# Patient Record
Sex: FEMALE | Race: BLACK OR AFRICAN AMERICAN | URBAN - METROPOLITAN AREA
[De-identification: names, ages, dates, MRNs, and addresses within clinical notes are randomized per-mention and may not be internally consistent; named-entity substitution may affect disease eponyms.]

---

## 2023-08-03 LAB — HEPATITIS B VIRUS SURFACE AB (MIU/ML) IN SERUM: >1000 MIU/ML

## 2024-08-27 ENCOUNTER — LAB (OUTPATIENT)
Dept: LAB | Facility: LAB | Age: 24
End: 2024-08-27

## 2024-08-28 LAB — COTININE UR QL SCN: NEGATIVE

## 2024-09-21 ENCOUNTER — OFFICE VISIT (OUTPATIENT)
Dept: URGENT CARE | Age: 24
End: 2024-09-21
Payer: COMMERCIAL

## 2024-09-21 VITALS
SYSTOLIC BLOOD PRESSURE: 124 MMHG | RESPIRATION RATE: 18 BRPM | HEART RATE: 79 BPM | WEIGHT: 160 LBS | OXYGEN SATURATION: 99 % | DIASTOLIC BLOOD PRESSURE: 83 MMHG | TEMPERATURE: 99.2 F

## 2024-09-21 DIAGNOSIS — J01.10 ACUTE FRONTAL SINUSITIS, RECURRENCE NOT SPECIFIED: Primary | ICD-10-CM

## 2024-09-21 RX ORDER — DOXYCYCLINE 100 MG/1
100 CAPSULE ORAL 2 TIMES DAILY
Qty: 20 CAPSULE | Refills: 0 | Status: SHIPPED | OUTPATIENT
Start: 2024-09-21 | End: 2024-10-01

## 2024-09-21 ASSESSMENT — ENCOUNTER SYMPTOMS
CHILLS: 0
FEVER: 0
COUGH: 1
CHEST TIGHTNESS: 0
SINUS PAIN: 1
SORE THROAT: 1
WHEEZING: 0
SINUS PRESSURE: 1

## 2024-09-21 NOTE — PATIENT INSTRUCTIONS
Take medications as directed.  Follow-up with PCP or return to clinic if symptoms are not improving in the next 3 to 5 days.  You may continue to use Bromfed or Mucinex as needed.

## 2024-09-21 NOTE — PROGRESS NOTES
Subjective   Patient ID: Griselda Duarte is a 24 y.o. female. They present today with a chief complaint of Sinusitis (Congestion, drainage and ear pressure X 2 weeks.).    History of Present Illness    Sinusitis  Associated symptoms: congestion, cough and sore throat    Associated symptoms: no chills, no fever and no wheezing        Reports congestion, drainage, and ear pressure for 2 weeks.  She has been taking Tylenol and Mucinex as needed.  Reports increasing sinus pressure the past few days.    Past Medical History  Allergies as of 09/21/2024 - Reviewed 09/21/2024   Allergen Reaction Noted    Amoxicillin Hives 09/21/2024    Omnicef [cefdinir] Hives 09/21/2024    Penicillins Hives 09/21/2024       (Not in a hospital admission)       No past medical history on file.    No past surgical history on file.     reports that she has never smoked. She has never used smokeless tobacco.    Review of Systems  Review of Systems   Constitutional:  Negative for chills and fever.   HENT:  Positive for congestion, postnasal drip, sinus pressure, sinus pain and sore throat.    Respiratory:  Positive for cough. Negative for chest tightness and wheezing.                                   Objective    Vitals:    09/21/24 1212   BP: 124/83   Pulse: 79   Resp: 18   Temp: 37.3 °C (99.2 °F)   SpO2: 99%   Weight: 72.6 kg (160 lb)     Patient's last menstrual period was 09/11/2024 (exact date).    Physical Exam  Constitutional:       Appearance: Normal appearance.   HENT:      Head: Normocephalic.      Right Ear: Tympanic membrane, ear canal and external ear normal.      Left Ear: Tympanic membrane, ear canal and external ear normal.      Nose: Congestion present.      Right Sinus: Frontal sinus tenderness present.      Left Sinus: Frontal sinus tenderness present.      Mouth/Throat:      Mouth: Mucous membranes are moist.   Eyes:      Conjunctiva/sclera: Conjunctivae normal.   Cardiovascular:      Rate and Rhythm: Normal rate and regular  rhythm.      Heart sounds: Normal heart sounds.   Pulmonary:      Effort: Pulmonary effort is normal.      Breath sounds: Normal breath sounds.   Skin:     General: Skin is warm and dry.   Neurological:      Mental Status: She is alert.         Procedures    Point of Care Test & Imaging Results from this visit  No results found for this visit on 09/21/24.   No results found.    Diagnostic study results (if any) were reviewed by HESHAM Ruiz.    Assessment/Plan   Allergies, medications, history, and pertinent labs/EKGs/Imaging reviewed by HESHAM Ruiz.     Medical Decision Making  Will start patient on doxycycline for acute sinusitis.  Patient was told she may continue taking Mucinex or Bromfed as needed.    Orders and Diagnoses  Diagnoses and all orders for this visit:  Acute frontal sinusitis, recurrence not specified  -     doxycycline (Vibramycin) 100 mg capsule; Take 1 capsule (100 mg) by mouth 2 times a day for 10 days. Take with at least 8 ounces (large glass) of water, do not lie down for 30 minutes after      Medical Admin Record      Patient disposition: Home    Electronically signed by HESHAM Ruiz  12:23 PM    Pt called and states doxy making vomit, pt is to dc doxy and start azithromycin  Sent-apd

## 2025-05-10 ENCOUNTER — OFFICE VISIT (OUTPATIENT)
Dept: URGENT CARE | Age: 25
End: 2025-05-10
Payer: COMMERCIAL

## 2025-05-10 VITALS
HEIGHT: 65 IN | DIASTOLIC BLOOD PRESSURE: 75 MMHG | SYSTOLIC BLOOD PRESSURE: 117 MMHG | RESPIRATION RATE: 18 BRPM | BODY MASS INDEX: 27.49 KG/M2 | HEART RATE: 95 BPM | WEIGHT: 165 LBS | OXYGEN SATURATION: 99 % | TEMPERATURE: 100.4 F

## 2025-05-10 DIAGNOSIS — Z20.822 LAB TEST NEGATIVE FOR COVID-19 VIRUS: ICD-10-CM

## 2025-05-10 DIAGNOSIS — R50.9 FEVER, UNSPECIFIED FEVER CAUSE: ICD-10-CM

## 2025-05-10 DIAGNOSIS — J01.40 ACUTE NON-RECURRENT PANSINUSITIS: ICD-10-CM

## 2025-05-10 DIAGNOSIS — R68.89 FLU-LIKE SYMPTOMS: ICD-10-CM

## 2025-05-10 DIAGNOSIS — H65.192 OTHER NON-RECURRENT ACUTE NONSUPPURATIVE OTITIS MEDIA OF LEFT EAR: Primary | ICD-10-CM

## 2025-05-10 LAB
POC CORONAVIRUS SARS-COV-2 PCR: NEGATIVE
POC HUMAN RHINOVIRUS PCR: NEGATIVE
POC INFLUENZA A VIRUS PCR: NEGATIVE
POC INFLUENZA B VIRUS PCR: NEGATIVE
POC RAPID STREP: NEGATIVE
POC RESPIRATORY SYNCYTIAL VIRUS PCR: NEGATIVE

## 2025-05-10 RX ORDER — IBUPROFEN 200 MG
400 TABLET ORAL EVERY 6 HOURS PRN
COMMUNITY

## 2025-05-10 RX ORDER — NORETHINDRONE ACETATE AND ETHINYL ESTRADIOL AND FERROUS FUMARATE 1.5-30(21)
1 KIT ORAL DAILY
COMMUNITY

## 2025-05-10 RX ORDER — ACETAMINOPHEN 500 MG
1000 TABLET ORAL ONCE
Status: COMPLETED | OUTPATIENT
Start: 2025-05-10 | End: 2025-05-10

## 2025-05-10 RX ORDER — AZITHROMYCIN 250 MG/1
TABLET, FILM COATED ORAL
Qty: 6 TABLET | Refills: 0 | Status: SHIPPED | OUTPATIENT
Start: 2025-05-10

## 2025-05-10 RX ORDER — ACETAMINOPHEN 160 MG
TABLET,CHEWABLE ORAL AS NEEDED
COMMUNITY

## 2025-05-10 RX ADMIN — Medication 1000 MG: at 15:14

## 2025-05-10 ASSESSMENT — ENCOUNTER SYMPTOMS
SHORTNESS OF BREATH: 0
HEADACHES: 1
SINUS PRESSURE: 1
CARDIOVASCULAR NEGATIVE: 1
SORE THROAT: 1
DIZZINESS: 0
LIGHT-HEADEDNESS: 0
FEVER: 1
TROUBLE SWALLOWING: 0
VOMITING: 0
GASTROINTESTINAL NEGATIVE: 1
COUGH: 1
NAUSEA: 0
RHINORRHEA: 1
WHEEZING: 0
FATIGUE: 1
MYALGIAS: 1
CHILLS: 0

## 2025-05-10 ASSESSMENT — PAIN SCALES - GENERAL: PAINLEVEL_OUTOF10: 6

## 2025-05-10 NOTE — PATIENT INSTRUCTIONS
You have evidence of a sinus infection and ear infection. You are being treated with azithromycin. Please take as directed.     Tricks to manage your symptoms:   -Drink plenty of water to stay hydrated.  -Get plenty of rest.   -Ibuprofen/NSAIDS (Advil® or Motrin®) or acetaminophen (Tylenol®) may be used for temperature and/or discomfort.  Do not exceed the recommended daily amount as written on the bottle. Do not take ibuprofen or other NSAIDS if you are on a blood thinner, have hypertension, hear failure, or kidney disease..  Please contact your PCP if you have questions..  -You can take a daily non-drowsy allergy medication such as Allergra (Fexofendadine), Zyrtec (Cetirizine) or Claritin (Loratadine) to decrease sinus and nasal inflammation and drainage.  -If you have elevated blood pressure you should avoid products with products that contain phenylephrine or pseudophedrine as these medications can increase your blood pressure.  -Putting a small amount of honey in tea may help with your sore throat and cough.  -Gargle with warm salt water 3 to 4 times each day. Mix 1/2 teaspoon (2.5 grams) salt with a cup (240 mL) of warm water.     -Nasal steroids spray (fluticasone or generic equivalent) 1-2 times daily as needed  -Saline nasal spray  -You can take OTC mucinex (Guaifenesin): Can take up to 1200 mg twice daily.    -Drink plenty of water. Water helps thin the mucus so your sinuses can drain more easily.   -Use a humidifier.  -inhale steam 3 to 4 times a day (for example, sit in the bathroom with the shower running).  -Apply a warm, moist washcloth to your face 3 to 4 times a day, or as directed by your caregiver.  -Nasal saline rinse    Please seek immediate medical evaluation if you develop:   -Shortness of breath or difficulty breathing  -Trouble swallowing, trouble breathing, or shortness of breath while lying down  -You are unable to take a deep breath  -You have difficulties swallowing  -You have chest pain    -You have heart palpitations  -You have fever > 102 F despite fever reducing medications   -You are unable to tolerate fluids for 6 hours or more  -You develop swelling and/or pain in your legs   -You feel faint, lightheaded, or dizzy  -Any other concerning symptoms    -If your symptoms are not improving over the next 36 hours, please return to the clinic or see your PCP. If your symptoms worsen or you develop shortness of breath, seek emergency care immediately.

## 2025-05-10 NOTE — LETTER
May 10, 2025     Patient: Griselda Duarte   YOB: 2000   Date of Visit: 5/10/2025       To Whom It May Concern:    Griselda Duarte was seen in my clinic on 5/10/2025 at 1:35 pm. Please excuse Griselda for her absence from work on this day to make the appointment. May return to work on 5/12/2025.    If you have any questions or concerns, please don't hesitate to call.         Sincerely,         Demetrice Pierre PA-C        CC: No Recipients

## 2025-05-10 NOTE — PROGRESS NOTES
"Subjective   Patient ID: Griselda Duarte is a 25 y.o. female. They present today with a chief complaint of Other (Not feeling well since Tuesday with sore throat body aches and congestion).    History of Present Illness  -c/o URI symptoms including sore throat, body aches, sinus drainage & congestion, ear pain L > R  -she works in the [employer]   -she has not had tylenol this this morning  -denies CP, SOB, inability to swallow, dizziness/lightheadedness, neck pain      History provided by:  Medical records and patient      Past Medical History  Allergies as of 05/10/2025 - Reviewed 05/10/2025   Allergen Reaction Noted    Amoxicillin Hives 09/21/2024    Omnicef [cefdinir] Hives 09/21/2024    Penicillins Hives 09/21/2024       Prescriptions Prior to Admission[1]     Medical History[2]    Surgical History[3]     reports that she has never smoked. She has never used smokeless tobacco.    Review of Systems  Review of Systems   Constitutional:  Positive for fatigue and fever. Negative for chills.   HENT:  Positive for congestion, ear pain, postnasal drip, rhinorrhea, sinus pressure and sore throat. Negative for trouble swallowing.    Respiratory:  Positive for cough. Negative for shortness of breath and wheezing.    Cardiovascular: Negative.    Gastrointestinal: Negative.  Negative for nausea and vomiting.   Genitourinary: Negative.    Musculoskeletal:  Positive for myalgias.   Skin: Negative.  Negative for rash.   Neurological:  Positive for headaches. Negative for dizziness and light-headedness.   All other systems reviewed and are negative.    Objective    Vitals:    05/10/25 1441   BP: 117/75   Pulse: 95   Resp: 18   Temp: (!) 38 °C (100.4 °F)   TempSrc: Oral   SpO2: 99%   Weight: 74.8 kg (165 lb)   Height: 1.651 m (5' 5\")     No LMP recorded.    Physical Exam  Vitals reviewed.   Constitutional:       General: She is not in acute distress.     Appearance: Normal appearance. She is not ill-appearing.   HENT:      Head: " "Normocephalic and atraumatic.      Right Ear: There is no impacted cerumen. No mastoid tenderness. Tympanic membrane is injected. Tympanic membrane is not perforated, erythematous or bulging.      Left Ear: No swelling. There is no impacted cerumen. No mastoid tenderness. Tympanic membrane is erythematous and bulging. Tympanic membrane is not injected or perforated.   Eyes:      General: Lids are normal.      Extraocular Movements: Extraocular movements intact.      Conjunctiva/sclera: Conjunctivae normal.      Pupils: Pupils are equal, round, and reactive to light.   Cardiovascular:      Rate and Rhythm: Normal rate and regular rhythm. Extrasystoles are present.     Pulses: Normal pulses.      Heart sounds: Normal heart sounds.   Pulmonary:      Effort: Pulmonary effort is normal.      Breath sounds: No wheezing, rhonchi or rales.   Musculoskeletal:      Cervical back: Normal range of motion and neck supple.      Right lower leg: No edema.      Left lower leg: No edema.   Lymphadenopathy:      Cervical: Cervical adenopathy present.   Skin:     Findings: No rash.   Neurological:      General: No focal deficit present.      Mental Status: She is alert and oriented to person, place, and time.       /75   Pulse 95   Temp (!) 38 °C (100.4 °F) (Oral)   Resp 18   Ht 1.651 m (5' 5\")   Wt 74.8 kg (165 lb)   SpO2 99%   BMI 27.46 kg/m²       Procedures    Point of Care Test & Imaging Results from this visit  Results for orders placed or performed in visit on 05/10/25   POCT SPOTFIRE R/ST Panel Mini w/COVID ([facility]) manually resulted    Specimen: Swab   Result Value Ref Range    POC Sars-Cov-2 PCR Negative Negative    POC Respiratory Syncytial Virus PCR Negative Negative    POC Influenza A Virus PCR Negative Negative    POC Influenza B Virus PCR Negative Negative    POC Human Rhinovirus PCR Negative Negative   POCT rapid strep A manually resulted   Result Value Ref Range    POC Rapid Strep Negative Negative    "   Imaging  No results found.    Cardiology, Vascular, and Other Imaging  No other imaging results found for the past 2 days      Diagnostic study results (if any) were reviewed by Demetrice Pierre PA-C.    Assessment/Plan   Allergies, medications, history, and pertinent labs/EKGs/Imaging reviewed by Demetrice Pierre PA-C.     Medical Decision Making  Based on history and physical exam consistent with URI/sinusitis c/b acute bacterial otitis media of the left ear. No evidence strep throat, mastoiditis, or pneumonia.  Rapid strep negative.  Viral panel negative for covid, RSV, flu and rhinovirus. Given duration of symptoms of greater than 1 week, and evidence of otitis media She has allergy to amox/PCN, will treat with azithromycin which she has tolerated in the past.  She was febrile on presentation, and given dose of tylenol.  Advised seeking immediate emergency medical attention if symptoms fail to improve, worsen or any concerning symptoms arise. Patient voiced full understanding and agreement to plan.     We discussed with the patient our clinical thoughts at this time given the above findings and clinical assessment and we had a shared decision-making conversation in a patient-centered decision-making model on how to proceed forward. The patient was instructed on the importance of a close follow-up with Primary Care Provider and other care providers. The patient was also advised that an Urgent care diagnosis is often a preliminary impression and that definitive care is often not able to be given completley in the Urgent care setting.    Orders and Diagnoses  Diagnoses and all orders for this visit:  Other non-recurrent acute nonsuppurative otitis media of left ear  -     azithromycin (Zithromax) 250 mg tablet; Take 2 tablets (500 mg) by mouth day 1, and take 1 tablet (250 mg) by mouth daily on day 2-5  -     Referral to Primary Care; Future  Acute non-recurrent pansinusitis  -     azithromycin (Zithromax) 250 mg  tablet; Take 2 tablets (500 mg) by mouth day 1, and take 1 tablet (250 mg) by mouth daily on day 2-5  -     Referral to Primary Care; Future  Flu-like symptoms  -     POCT SPOTFIRE R/ST Panel Mini w/COVID ([facility]) manually resulted  Fever, unspecified fever cause  -     acetaminophen (Tylenol) tablet 1,000 mg  -     POCT rapid strep A manually resulted  Lab test negative for COVID-19 virus  -     POCT rapid strep A manually resulted      Medical Admin Record  Administrations This Visit       acetaminophen (Tylenol) tablet 1,000 mg       Admin Date  05/10/2025 Action  Given Dose  1,000 mg Route  oral Documented By  Kathy Guerra MA                    Patient disposition: Home    Electronically signed by Demetrice Pierre PA-C  5:54 PM           [1] (Not in a hospital admission)   [2] No past medical history on file.  [3] No past surgical history on file.